# Patient Record
(demographics unavailable — no encounter records)

---

## 2024-10-28 NOTE — PHYSICAL EXAM
[Well Developed] : well developed [Well Nourished] : well nourished [No Acute Distress] : no acute distress [Normal Venous Pressure] : normal venous pressure [No Carotid Bruit] : no carotid bruit [Normal S1, S2] : normal S1, S2 [No Rub] : no rub [Clear Lung Fields] : clear lung fields [Good Air Entry] : good air entry [No Respiratory Distress] : no respiratory distress  [Soft] : abdomen soft [Non Tender] : non-tender [No Masses/organomegaly] : no masses/organomegaly [Normal Bowel Sounds] : normal bowel sounds [Normal Gait] : normal gait [No Edema] : no edema [No Cyanosis] : no cyanosis [No Clubbing] : no clubbing [No Varicosities] : no varicosities [No Rash] : no rash [No Skin Lesions] : no skin lesions [Moves all extremities] : moves all extremities [No Focal Deficits] : no focal deficits [Normal Speech] : normal speech [Alert and Oriented] : alert and oriented [Normal memory] : normal memory [General Appearance - Well Developed] : well developed [Normal Appearance] : normal appearance [Well Groomed] : well groomed [General Appearance - Well Nourished] : well nourished [No Deformities] : no deformities [General Appearance - In No Acute Distress] : no acute distress [Normal Conjunctiva] : the conjunctiva exhibited no abnormalities [Normal Oral Mucosa] : normal oral mucosa [Normal Jugular Venous A Waves Present] : normal jugular venous A waves present [Normal Jugular Venous V Waves Present] : normal jugular venous V waves present [No Jugular Venous Zepeda A Waves] : no jugular venous zepeda A waves [Respiration, Rhythm And Depth] : normal respiratory rhythm and effort [Exaggerated Use Of Accessory Muscles For Inspiration] : no accessory muscle use [Auscultation Breath Sounds / Voice Sounds] : lungs were clear to auscultation bilaterally [Abdomen Soft] : soft [Bowel Sounds] : normal bowel sounds [Nail Clubbing] : no clubbing of the fingernails [Abnormal Walk] : normal gait [Cyanosis, Localized] : no localized cyanosis [Skin Color & Pigmentation] : normal skin color and pigmentation [Skin Turgor] : normal skin turgor [] : no rash [Oriented To Time, Place, And Person] : oriented to person, place, and time [Impaired Insight] : insight and judgment were intact [Affect] : the affect was normal [Mood] : the mood was normal [No Anxiety] : not feeling anxious [5th Left ICS - MCL] : palpated at the 5th LICS in the midclavicular line [Normal] : normal [No Precordial Heave] : no precordial heave was noted [Normal Rate] : normal [Rhythm Regular] : regular [Normal S1] : normal S1 [Normal S2] : normal S2 [No Gallop] : no gallop heard [I] : a grade 1 [2+] : left 2+ [No Abnormalities] : the abdominal aorta was not enlarged and no bruit was heard [No Pitting Edema] : no pitting edema present [Apical Thrill] : no thrill palpable at the apex [S3] : no S3 [S4] : no S4 [Click] : no click [Pericardial Rub] : no pericardial rub [Right Carotid Bruit] : no bruit heard over the right carotid [Left Carotid Bruit] : no bruit heard over the left carotid [Right Femoral Bruit] : no bruit heard over the right femoral artery [Left Femoral Bruit] : no bruit heard over the left femoral artery

## 2024-10-28 NOTE — ASSESSMENT
[FreeTextEntry1] : This is a 56-year-old male with a past medical history significant for past medical history significant for pulmonary emboli, mitral valve regurgitation, non-insulin-dependent diabetes mellitus, s/p inferior wall myocardial infarction, coronary artery disease s/p coronary artery stents (last sent placed September 2024), lupus anticoagulant, s/p [place] filter, dyspepsia, and sleep apnea who comes in for follow-up cardiac evaluation.   Cardiac risk factors include HTN, HLD, and hx of DM (A1C reported to be 5.5%).  HPI: He presents for follow-up s/p cardiac catheterization done 09/26/2024 with successful PCI to LCX.   He is feeling generally well today but reports still experiencing occasional episodes of dyspnea on exertion and chest discomfort at times when going uphill or getting anxious/stressed. He reports that this subsides with rest/relaxation. Denies palpitations, dizziness, syncopal episodes. I've explained that these times may be related to deconditioning or his pulmonary status.   Current Medications: Atorvastatin 40 mg daily, Amlodipine 5 mg daily, Atenolol 100 mg daily, Clopidogrel 75 mg daily, Fish oil 1000 mg daily, Furosemide 20 mg daily, Losartan 100 mg daily, and Warfarin managed by his rheumatologist.  PMH: He states that he is on warfarin as per his rheumatologist, Dr. Alexander for management/history of pulmonary embolism and also states that he has always been on high dose baby aspirin which may have been prescribed by his rheumatologist as well. I explained that from a cardiac standpoint 81 mg of aspirin is sufficient (he has a cardiac stent) however he should verify this with his pulmonologist/rheumatologist.  -Warfarin for management of his history of PE's.   BLOOD PRESSURE: -BP is controlled on today's exam.   BLOOD WORK:  *LDL target goal < 70* -New blood work was done 10/28/2024 to evaluate lipid profile, CBC, BMP, hepatic function, A1C and TSH. -Lipid panel done May 2024 demonstrated triglyceride 155, cholesterol 118, HDL 33, LDL 58, non-HDL 85, LDL direct 60 -Lipid panel done December 2023 demonstrated triglycerides 172, cholesterol 113, HDL 34, LDL 50, Non-HDL 79, LDL direct 63.  -New blood work was done 06/05/2023 demonstrated triglyceride 124, cholesterol 90, HDL 30, LDL 35, non-HDL 59, LDL direct 37.   TESTING/REPORTS: -Cardiac catheterization done 09/26/2024 demonstrated 80% stenosis of proximal circumflex s/p successful PCI with CARLOS, 30% stenosis of mid LAD, 40% stenosis of first diagonal, and 50% stenosis of distal RCA.    -Exercise stress test on September 23, 2024 demonstrated no evidence of myocardial ischemia, but the test was stopped secondary to chest pain/pressure which resolved in the recovery.  -EKG done 05/13/2024 demonstrated sinus bradycardia with nonspecific ST-T wave changes BPM of 59.   -Echocardiogram done December 2023 demonstrated normal left ventricular systolic function EF 64%, minimal mitral regurgitation, mild tricuspid regurgitation, mild pulmonic regurgitation, trace aortic regurgitation.   -EKG done 12/15/2023 which demonstrated sinus bradycardia with nonspecific ST-T wave changes BPM of 54.  -EKG done 06/05/2023 which demonstrated regular sinus rhythm with nonspecific ST-T wave changes BPM of 71.  -EKG done 12/05/2022 which demonstrated regular sinus rhythm with nonspecific ST-T wave changes BPM of 63.   -Echocardiogram done in the office 12/5/2022 which demonstrated borderline concentric LVH, mild thickening of the aortic valve leaflets, mild dilated left atrium, minimal to mild mitral and tricuspid valve regurgitation with trace aortic and pulmonic regurgitation with normal left ventricular systolic function ejection fraction 62%  -EKG done 08/29/2022 which demonstrated regular sinus rhythm with nonspecific ST-T wave changes BPM of 62.  -EKG done Mar 25, 2022 which demonstrated regular sinus rhythm with nonspecific ST-T wave changes, BPM of 58.  -Cardiac cath done May 18, 2021 demonstrated patent RCA and diagonal stents with mild LAD disease. There was 40% stenosis in the mid LAD and discrete 40% stenosis at the site of a prior stent D1. There was a diffuse 30% stenosis in the proximal circumflex. The proximal RCA demonstrated minor luminal irregularities with no flow-limiting lesions. There was a tubular 10% stenosis at the site of a prior stent.  -Echocardiogram done on 6/2/21 demonstrated mild mitral, tricuspid regurgitation, thickened aortic valve with trace aortic regurgitation, mild atrial enlargement with mild concentric LVH with normal left ventricular systolic function. EF of 60%  -Electrocardiogram done 4/14/2021 demonstrated normal sinus rhythm rate 61 beats per minute otherwise remarkable for small Q-wave in lead 2, 3 and aVF.  -Echocardiogram done on October 30 2019 which demonstrated mild mitral and tricuspid regurgitation with normal left ventricular systolic function.  -PMH: The patient had a cardiac catheterization done June 23, 2009 at [facility]. He had a coronary thrombectomy with stent placement to the distal right coronary artery, after balloon angioplasty and thrombectomy. He also had stent placement to the proximal right coronary artery. This patient's LDL cholesterol goal is less than 70 mg/dL.   PLAN: -New blood work was done 10/28/2024 to evaluate lipid profile, CBC, BMP, hepatic function, A1C and TSH. -He will continue with his usual medications and will contact the office if he is having any complaints between now and their next follow up appointment. -He will follow-up with Dr. Alexander and his pulmonologist Dr. Tran routinely.   I have discussed the plan of care with Mr. STEPHENIE TERESA and he will follow up in 1-2 months. He is compliant with all of his medications.  The patient understands that aerobic exercises must be increased to ~40 minutes 4 times/week and a detailed discussion of lifestyle modification was done today.  The patient has a good understanding of the diagnosis, treatment plan and lifestyle modification.  He will contact me at the office for any questions with their care or any changes in their health status.  The plan of care was discussed with the patient with supervision physician Dr. Sonido Lamas and will be carried out as noted above.  SIVAN Perez NP

## 2024-10-28 NOTE — REASON FOR VISIT
[CV Risk Factors and Non-Cardiac Disease] : CV risk factors and non-cardiac disease [Other: ____] : [unfilled] [Follow-Up - Clinic] : a clinic follow-up of [Coronary Artery Disease] : coronary artery disease [Hyperlipidemia] : hyperlipidemia [Hypertension] : hypertension [Mitral Regurgitation] : mitral regurgitation [Peripheral Vascular Disease] : peripheral vascular disease [FreeTextEntry3] : Dr. Cullen  [FreeTextEntry1] : h/o 4 coronary artery stents, h/o krista filter in place, antiphospholipid antibody syndrome, NIDDM, sleep apnea, SLE

## 2024-10-28 NOTE — DISCUSSION/SUMMARY
[FreeTextEntry1] : Dr. Lamas-(PRIOR VISIT and PMH WITH Dr. Lamas): This is a 56-year-old male with past medical history significant for coronary artery disease, pulmonary emboli, mitral valve regurgitation, non-insulin-dependent diabetes mellitus, status post inferior wall myocardial infarction, sleep apnea, coronary artery stents, lupus anticoagulant, status post [place] filter, dyspepsia, who comes in for follow-up cardiac evaluation.   The patient has been complaining of some episodes of dyspnea on exertion.  He denies palpitations, dizziness, or syncope. The patient discontinued his Crestor therapy secondary to "muscle aches".  He did not contact the office.  I have instructed him to rechallenge himself with his Crestor therapy and see if the symptoms come back.  He understands now that in setting of coronary artery disease he must be on lipid-lowering therapy. Exercise stress test on September 23, 2024 demonstrated no evidence of myocardial ischemia, but the test was stopped secondary to chest pain/pressure which resolved in the recovery.. Given this patient's history of myocardial infarction, coronary artery disease, status post coronary stents, and residual 30 to 40% lesions in 2021, I would recommend proceeding with cardiac catheterization to evaluate his dyspnea on exertion as well as his exertional chest pressure/pain. The patient will follow-up with me after cardiac catheterization is completed.  He will hold his Coumadin 48 hours prior to the procedure and continue on his aspirin dosage. The patient understands if he does develop any further chest discomfort or shortness of breath or dyspnea he must go to emergency room immediately. Further recommendation was made after results of his cardiac catheterization available for my review. PMH: The patient had an abnormal nuclear stress test done May 3, 2021.  He complained of chest discomfort with the stress test.  The findings demonstrated severe fixed defect in the basal to mid inferior wall and inferolateral walls consistent with medium sized infarction with no significant carline-infarct ischemia. Given his symptoms, prior clinical history and abnormal nuclear findings the patient underwent a cardiac catheterization on May 18, 2021. Echo Doppler examination done December 15, 2023 demonstrated mild tricuspid valve regurgitation, mild pulmonic valve regurgitation, trace aortic valve regurgitation, minimal mitral valve regurgitation, left atrial enlargement, and ejection fraction of 64%. Cardiac catheterization May 18, 2021 demonstrated 40% stenosis of the mid left anterior descending artery, 40% stenosis in the first diagonal branch at the site of previous stent, 30% stenosis in the proximal circumflex artery, right coronary artery showed no stenosis in the mid section of prior stent and distal right coronary artery area without stenosis in the area of previous stent. Medical therapy was recommended. He will follow-up with his rheumatologist and continue on his anticoagulation. He is followed by his hematologist and rheumatologist. Electrocardiogram done June 2, 2021 demonstrated sinus bradycardia rate of 58 bpm is otherwise remarkable for Q waves in leads II, III and aVF and left atrial abnormality. Blood work done January 15, 2021 demonstrated cholesterol 113, HDL of 33, LDL calculated 56, triglycerides 119, and non-HDL cholesterol of 80 mg/dL.  PMH: The patient had a cardiac catheterization done June 23, 2009 at [facility]. He had a coronary thrombectomy with stent placement to the distal right coronary artery, after balloon angioplasty and thrombectomy. He also had stent placement to the proximal right coronary artery. This patient's LDL cholesterol goal is less than 55 mg/dL.  The patient understands that aerobic exercises must be increased to 40 minutes 4 times per week. A detailed discussion of lifestyle modification was done today. The patient has a good understanding of the diagnosis, and treatment plan. Lifestyle modification was also outlined.

## 2024-11-04 NOTE — REVIEW OF SYSTEMS
[As noted in HPI] : as noted in HPI [As Noted in HPI] : as noted in HPI [Negative] : Heme/Lymph [FreeTextEntry6] : recent URI; received antibiotics

## 2024-11-04 NOTE — DISCUSSION/SUMMARY
[FreeTextEntry1] : 1.  SLE:  generally quiet with no overt activity.  No fever, rash, arthritis, oral ulcers, alopecia.\par  2.  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  Chronic CellCept use:  tolerating without any toxicities such as GI or infectious\par  4.  Antiphospholipid Syndrome:  remains on Coumadin without bleeding or thrombotic episodes\par  5.  Hypertension\par  6.  Hypercholesterolemia\par  7.  Coronary artery disease\par  8.  Diabetes\par  9.  "Flu": in April 2014, now recovered\par  10.  Left knee injury:  unable to go for an MRI given that he has stents in his coronaries.  Currently in a brace. \par  \par  Plan\par  1.  Renew medicines\par  2.  Lab tests\par  3.  Return 1 month\par  4.  PNVX given

## 2024-11-04 NOTE — ASSESSMENT
[FreeTextEntry1] : 1.  SLE:  generally quiet with no overt activity except for polyarthralgias.  No fever, rash, arthritis, oral ulcers, alopecia. 2.  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  Because of a concern for COVID, he reduced the MMF to 500 mg EOD and eventually stopped the medication.  3.  Chronic CellCept use:  tolerating without any toxicities such as GI or infections; however, in 2021 he decided to stop the medication.  4.  Antiphospholipid Syndrome:  remains on Coumadin without thrombotic episodes.  In November 2017 he had some bleeding issues, and he held the warfarin for a brief period until the bleeding resolved.  5.  Hypertension 6.  Hypercholesterolemia: he stopped rosuvastatin because of leg cramps and was subsequently placed on atorvastatin, which caused the same symptoms.  7.  Coronary artery disease: he had EKG changes consistent with another MI in the summer 2015.  He had two more stents placed. He visited Dr. HAZEL Lamas in early November 2017.  Since that visit he has noted intermittent dyspnea on exertion without chest pain, cough.  In 2024 he had a stent placed.  8.  Diabetes 9.  Left knee injury:  unable to go for an MRI given that he has stents in his coronaries.  Currently in a brace.  10.  Varicosities 11.  Pelvic/hip pain:  onset after walking.  The pain worsened particularly on the left side suggesting severe OA or AVN. No paresthesias 12.  Sciatica: onset in early 2022 involving the left side with pain from the buttocks to the left foot.  Although improving as of Mar 2022, the has substantial skin sensitivity, but no rash.   Plan: 1.  Lab tests 2.  Return 1-3 months, depending on findings 3.  Regular INR checks emphasized  4.  Antiphospholipid Syndrome, known as APS, is a chronic autoimmune disease that leads to arterial and/or venous thrombotic events and therefore poses threats to proper organ function and even to life. Therefore, close surveillance of all bodily functions is required, including but not limited to central and peripheral nervous system, ocular and auditory systems, cardiopulmonary function, kidney function, mucocutaneous and musculoskeletal systems. Surveillance consists of history, physical, and laboratory tests. Treatment varies, but those at high risk are generally anticoagulated.  Therefore, patients with APS require close monitoring in the form of blood and urine tests. 5.  Systemic Lupus Erythematosus, known as lupus, is a chronic autoimmune disease that can affect any organ in the body posing threats to proper organ function and even to life. Therefore, close surveillance of all bodily functions is required, including but not limited to central and peripheral nervous system, ocular and auditory systems, cardiopulmonary function, kidney function, mucocutaneous and musculoskeletal systems as well as constitutional manifestations. Surveillance consists of history, physical, and laboratory tests. Treatment varies, but most of the drugs used are high risk and therefore also require close monitoring in the form of blood and urine tests. 6.  High risk medications used in the treatment of rheumatic diseases include steroids, disease modifying agents, immunosuppressive therapies, antimalarials, biologics, and chemotherapy. Regardless of which drug or class of drug, the potential toxicities of these therapies mandate close monitoring in the form of a history, physical, and laboratory tests. 7. Shingrix vaccine recommended 8. F/U cardiology related to statin intolerance

## 2024-11-04 NOTE — HISTORY OF PRESENT ILLNESS
[FreeTextEntry1] : 1.  SLE:  generally quiet with no overt activity except for polyarthralgias.  No fever, rash, arthritis, oral ulcers, alopecia. 2.  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  Because of a concern for COVID, he reduced the MMF to 500 mg EOD and eventually stopped the medication.  3.  Chronic CellCept use:  tolerating without any toxicities such as GI or infections; however, in 2021 he decided to stop the medication.  4.  Antiphospholipid Syndrome:  remains on Coumadin without thrombotic episodes.  In November 2017 he had some bleeding issues, and he held the warfarin for a brief period until the bleeding resolved.  5.  Hypertension 6.  Hypercholesterolemia: he stopped rosuvastatin because of leg cramps and was subsequently placed on atorvastatin, which caused the same symptoms.  7.  Coronary artery disease: he had EKG changes consistent with another MI in the summer 2015.  He had two more stents placed. He visited Dr. HAZEL Lamas in early November 2017.  Since that visit he has noted intermittent dyspnea on exertion without chest pain, cough.  In 2024 he had a stent placed.  8.  Diabetes 9.  Left knee injury:  unable to go for an MRI given that he has stents in his coronaries.  Currently in a brace.  10.  Varicosities 11.  Pelvic/hip pain:  onset after walking.  The pain worsened particularly on the left side suggesting severe OA or AVN. No paresthesias 12.  Sciatica: onset in early 2022 involving the left side with pain from the buttocks to the left foot.  Although improving as of Mar 2022, the has substantial skin sensitivity, but no rash.   Meds hydroxychloroquine 400 mg/d  Coumadin 3 alt 4 mg Atenolol 100 mg/d CellCept 0.5 g 1xd stopped Norvasc 5 mg/d Plavix 75 mg/d atorvastatin 40 mg/d Lasix 20 mg/d Vit D 4000 IU/d Vit C  Vaccines PNVX 10/29/07 PNVX 12/18/2014 ([id number]; exp 5Nov2015) PNVX 23 1/15/2021 Prevnar 13  3/7/16 ([id number]; exp 4/17) Quadrivalent fluzone  11/2/15  ( AB; exp 6/16) Fluvacel 11/20/17  ([id number]; Jun 2018) Flu Quadrivalent 10/19/2020 ([id number]; exp 6/30/2021) Fluarix Quadrivalent 11/29/2021 (; exp 6/30/2022)

## 2024-11-04 NOTE — PHYSICAL EXAM
[General Appearance - Alert] : alert [General Appearance - In No Acute Distress] : in no acute distress [General Appearance - Well Nourished] : well nourished [General Appearance - Well Developed] : well developed [Sclera] : the sclera and conjunctiva were normal [Extraocular Movements] : extraocular movements were intact [Strabismus] : no strabismus was seen [Outer Ear] : the ears and nose were normal in appearance [Examination Of The Oral Cavity] : the lips and gums were normal [Nasal Cavity] : the nasal mucosa and septum were normal [Oropharynx] : the oropharynx was normal [Neck Appearance] : the appearance of the neck was normal [Neck Cervical Mass (___cm)] : no neck mass was observed [Jugular Venous Distention Increased] : there was no jugular-venous distention [Thyroid Diffuse Enlargement] : the thyroid was not enlarged [Respiration, Rhythm And Depth] : normal respiratory rhythm and effort [Exaggerated Use Of Accessory Muscles For Inspiration] : no accessory muscle use [Auscultation Breath Sounds / Voice Sounds] : lungs were clear to auscultation bilaterally [Heart Rate And Rhythm] : heart rate was normal and rhythm regular [Heart Sounds] : normal S1 and S2 [Heart Sounds Gallop] : no gallops [Murmurs] : no murmurs [Heart Sounds Pericardial Friction Rub] : no pericardial rub [Bowel Sounds] : normal bowel sounds [Abdomen Soft] : soft [Abdomen Tenderness] : non-tender [Abdomen Mass (___ Cm)] : no abdominal mass palpated [Cervical Lymph Nodes Enlarged Posterior Bilaterally] : posterior cervical [Cervical Lymph Nodes Enlarged Anterior Bilaterally] : anterior cervical [Supraclavicular Lymph Nodes Enlarged Bilaterally] : supraclavicular [No CVA Tenderness] : no ~M costovertebral angle tenderness [No Spinal Tenderness] : no spinal tenderness [Skin Color & Pigmentation] : normal skin color and pigmentation [Skin Turgor] : normal skin turgor [] : no rash [Sensation] : the sensory exam was normal to light touch and pinprick [Motor Exam] : the motor exam was normal [No Focal Deficits] : no focal deficits [Oriented To Time, Place, And Person] : oriented to person, place, and time [Impaired Insight] : insight and judgment were intact [Affect] : the affect was normal [Nail Clubbing] : no clubbing  or cyanosis of the fingernails [Involuntary Movements] : no involuntary movements were seen [Musculoskeletal - Swelling] : no joint swelling seen [Motor Tone] : muscle strength and tone were normal [FreeTextEntry1] : reduced left hip ROM

## 2024-12-09 NOTE — DISCUSSION/SUMMARY
[FreeTextEntry1] : This is a 56-year-old male with past medical history significant for coronary artery disease, status post drug-eluting stent to the left circumflex artery September 26, 2024, pulmonary emboli, mitral valve regurgitation, non-insulin-dependent diabetes mellitus, status post inferior wall myocardial infarction, sleep apnea, coronary artery stents, lupus anticoagulant, status post [place] filter, dyspepsia, who comes in for follow-up cardiac evaluation.    He denies palpitations, dizziness, or syncope. Electrocardiogram done December 9, 2024 demonstrated normal sinus rhythm rate 67 bpm is otherwise unremarkable. Cardiac catheterization done September 26, 2024 demonstrated 80% lesion to the proximal left circumflex artery, and patient received a drug-eluting stent, 30% lesion in the mid left anterior descending artery, 40% lesion in the first diagonal artery, 50% lesion in the distal right coronary artery and normal left main artery. This patient's LDL target is less than 70 mg/dL.  The patient has had significant myalgias, bilateral lower extremity cramping on statin therapy including Crestor, and atorvastatin. I would recommend he start on Repatha 140 mg subcutaneously every 2 weeks. The patient discontinued his Crestor therapy secondary to "muscle aches".  He did not contact the office.  I have instructed him to rechallenge himself with his Crestor therapy and see if the symptoms come back.  He understands now that in setting of coronary artery disease he must be on lipid-lowering therapy. Exercise stress test on September 23, 2024 demonstrated no evidence of myocardial ischemia, but the test was stopped secondary to chest pain/pressure which resolved in the recovery.. Given this patient's history of myocardial infarction, coronary artery disease, status post coronary stents, and residual 30 to 40% lesions in 2021, I would recommend proceeding with cardiac catheterization to evaluate his dyspnea on exertion as well as his exertional chest pressure/pain. The patient will follow-up with me after cardiac catheterization is completed.  He will hold his Coumadin 48 hours prior to the procedure and continue on his aspirin dosage. He will remain on Plavix as his antiplatelet agent and remains on warfarin for his antiphospholipid syndrome/lupus. PMH: The patient had an abnormal nuclear stress test done May 3, 2021.  He complained of chest discomfort with the stress test.  The findings demonstrated severe fixed defect in the basal to mid inferior wall and inferolateral walls consistent with medium sized infarction with no significant carline-infarct ischemia. Given his symptoms, prior clinical history and abnormal nuclear findings the patient underwent a cardiac catheterization on May 18, 2021. Echo Doppler examination done December 15, 2023 demonstrated mild tricuspid valve regurgitation, mild pulmonic valve regurgitation, trace aortic valve regurgitation, minimal mitral valve regurgitation, left atrial enlargement, and ejection fraction of 64%. Cardiac catheterization May 18, 2021 demonstrated 40% stenosis of the mid left anterior descending artery, 40% stenosis in the first diagonal branch at the site of previous stent, 30% stenosis in the proximal circumflex artery, right coronary artery showed no stenosis in the mid section of prior stent and distal right coronary artery area without stenosis in the area of previous stent. Medical therapy was recommended. He will follow-up with his rheumatologist and continue on his anticoagulation. He is followed by his hematologist and rheumatologist. Electrocardiogram done June 2, 2021 demonstrated sinus bradycardia rate of 58 bpm is otherwise remarkable for Q waves in leads II, III and aVF and left atrial abnormality. Blood work done January 15, 2021 demonstrated cholesterol 113, HDL of 33, LDL calculated 56, triglycerides 119, and non-HDL cholesterol of 80 mg/dL.  PMH: The patient had a cardiac catheterization done June 23, 2009 at [facility]. He had a coronary thrombectomy with stent placement to the distal right coronary artery, after balloon angioplasty and thrombectomy. He also had stent placement to the proximal right coronary artery. This patient's LDL cholesterol goal is less than 55 mg/dL.  The patient understands that aerobic exercises must be increased to 40 minutes 4 times per week. A detailed discussion of lifestyle modification was done today. The patient has a good understanding of the diagnosis, and treatment plan. Lifestyle modification was also outlined.

## 2024-12-09 NOTE — PHYSICAL EXAM
[Well Developed] : well developed [Well Nourished] : well nourished [No Acute Distress] : no acute distress [Normal Venous Pressure] : normal venous pressure [No Carotid Bruit] : no carotid bruit [Normal S1, S2] : normal S1, S2 [No Rub] : no rub [Clear Lung Fields] : clear lung fields [Good Air Entry] : good air entry [No Respiratory Distress] : no respiratory distress  [Soft] : abdomen soft [Non Tender] : non-tender [No Masses/organomegaly] : no masses/organomegaly [Normal Bowel Sounds] : normal bowel sounds [Normal Gait] : normal gait [No Edema] : no edema [No Cyanosis] : no cyanosis [No Clubbing] : no clubbing [No Varicosities] : no varicosities [No Rash] : no rash [No Skin Lesions] : no skin lesions [Moves all extremities] : moves all extremities [No Focal Deficits] : no focal deficits [Normal Speech] : normal speech [Alert and Oriented] : alert and oriented [Normal memory] : normal memory [General Appearance - Well Developed] : well developed [Normal Appearance] : normal appearance [Well Groomed] : well groomed [General Appearance - Well Nourished] : well nourished [No Deformities] : no deformities [General Appearance - In No Acute Distress] : no acute distress [Normal Conjunctiva] : the conjunctiva exhibited no abnormalities [Normal Oral Mucosa] : normal oral mucosa [Normal Jugular Venous A Waves Present] : normal jugular venous A waves present [Normal Jugular Venous V Waves Present] : normal jugular venous V waves present [No Jugular Venous Zepeda A Waves] : no jugular venous zepeda A waves [Respiration, Rhythm And Depth] : normal respiratory rhythm and effort [Exaggerated Use Of Accessory Muscles For Inspiration] : no accessory muscle use [Auscultation Breath Sounds / Voice Sounds] : lungs were clear to auscultation bilaterally [Bowel Sounds] : normal bowel sounds [Abdomen Soft] : soft [Abnormal Walk] : normal gait [Nail Clubbing] : no clubbing of the fingernails [Cyanosis, Localized] : no localized cyanosis [Skin Color & Pigmentation] : normal skin color and pigmentation [Skin Turgor] : normal skin turgor [] : no rash [Oriented To Time, Place, And Person] : oriented to person, place, and time [Impaired Insight] : insight and judgment were intact [Affect] : the affect was normal [Mood] : the mood was normal [No Anxiety] : not feeling anxious [5th Left ICS - MCL] : palpated at the 5th LICS in the midclavicular line [Normal] : normal [No Precordial Heave] : no precordial heave was noted [Normal Rate] : normal [Rhythm Regular] : regular [Normal S1] : normal S1 [Normal S2] : normal S2 [No Gallop] : no gallop heard [I] : a grade 1 [2+] : left 2+ [No Abnormalities] : the abdominal aorta was not enlarged and no bruit was heard [No Pitting Edema] : no pitting edema present [Apical Thrill] : no thrill palpable at the apex [S3] : no S3 [S4] : no S4 [Click] : no click [Pericardial Rub] : no pericardial rub [Right Carotid Bruit] : no bruit heard over the right carotid [Left Carotid Bruit] : no bruit heard over the left carotid [Right Femoral Bruit] : no bruit heard over the right femoral artery [Left Femoral Bruit] : no bruit heard over the left femoral artery

## 2024-12-09 NOTE — REASON FOR VISIT
Neurosurgery at bedside. Pt A&Ox4, respirations even and unlabored, skin color WDL warm and dry, C-collar on, pt laying flat on bed. Pending Neuro surgery consult. PT A&Ox4, respirations even and unlabored, skin color WDL warm and dry, pt is ambulatory with a steady gait. C-collar on. [CV Risk Factors and Non-Cardiac Disease] : CV risk factors and non-cardiac disease [Other: ____] : [unfilled] [Follow-Up - Clinic] : a clinic follow-up of [Coronary Artery Disease] : coronary artery disease [Hyperlipidemia] : hyperlipidemia [Hypertension] : hypertension [Mitral Regurgitation] : mitral regurgitation [Peripheral Vascular Disease] : peripheral vascular disease [FreeTextEntry3] : Dr. Cullen  [FreeTextEntry1] : h/o 4 coronary artery stents, h/o krista filter in place, antiphospholipid antibody syndrome, NIDDM, sleep apnea, SLE

## 2024-12-09 NOTE — ASSESSMENT
[FreeTextEntry1] : This is a 56-year-old male with a past medical history significant for past medical history significant for pulmonary emboli, mitral valve regurgitation, non-insulin-dependent diabetes mellitus, s/p inferior wall myocardial infarction, coronary artery disease s/p coronary artery stents (last sent placed September 2024), lupus anticoagulant, s/p [place] filter, dyspepsia, and sleep apnea who comes in for follow-up cardiac evaluation.   Cardiac risk factors include HTN, HLD, and hx of DM (A1C reported to be 5.5%).  HPI: He presents for follow-up s/p cardiac catheterization done 09/26/2024 with successful PCI to LCX.   He is feeling generally well today but reports still experiencing occasional episodes of dyspnea on exertion and chest discomfort at times when going uphill or getting anxious/stressed. He reports that this subsides with rest/relaxation. Denies palpitations, dizziness, syncopal episodes. I've explained that these times may be related to deconditioning or his pulmonary status.   Current Medications: Atorvastatin 40 mg daily, Amlodipine 5 mg daily, Atenolol 100 mg daily, Clopidogrel 75 mg daily, Fish oil 1000 mg daily, Furosemide 20 mg daily, Losartan 100 mg daily, and Warfarin managed by his rheumatologist.  PMH: He states that he is on warfarin as per his rheumatologist, Dr. Alexander for management/history of pulmonary embolism and also states that he has always been on high dose baby aspirin which may have been prescribed by his rheumatologist as well. I explained that from a cardiac standpoint 81 mg of aspirin is sufficient (he has a cardiac stent) however he should verify this with his pulmonologist/rheumatologist.  -Warfarin for management of his history of PE's.   BLOOD PRESSURE: -BP is controlled on today's exam.   BLOOD WORK:  *LDL target goal < 70* -New blood work was done 10/28/2024 to evaluate lipid profile, CBC, BMP, hepatic function, A1C and TSH. -Lipid panel done May 2024 demonstrated triglyceride 155, cholesterol 118, HDL 33, LDL 58, non-HDL 85, LDL direct 60 -Lipid panel done December 2023 demonstrated triglycerides 172, cholesterol 113, HDL 34, LDL 50, Non-HDL 79, LDL direct 63.  -New blood work was done 06/05/2023 demonstrated triglyceride 124, cholesterol 90, HDL 30, LDL 35, non-HDL 59, LDL direct 37.   TESTING/REPORTS: -Cardiac catheterization done 09/26/2024 demonstrated 80% stenosis of proximal circumflex s/p successful PCI with CARLOS, 30% stenosis of mid LAD, 40% stenosis of first diagonal, and 50% stenosis of distal RCA.    -Exercise stress test on September 23, 2024 demonstrated no evidence of myocardial ischemia, but the test was stopped secondary to chest pain/pressure which resolved in the recovery.  -EKG done 05/13/2024 demonstrated sinus bradycardia with nonspecific ST-T wave changes BPM of 59.   -Echocardiogram done December 2023 demonstrated normal left ventricular systolic function EF 64%, minimal mitral regurgitation, mild tricuspid regurgitation, mild pulmonic regurgitation, trace aortic regurgitation.   -EKG done 12/15/2023 which demonstrated sinus bradycardia with nonspecific ST-T wave changes BPM of 54.  -EKG done 06/05/2023 which demonstrated regular sinus rhythm with nonspecific ST-T wave changes BPM of 71.  -EKG done 12/05/2022 which demonstrated regular sinus rhythm with nonspecific ST-T wave changes BPM of 63.   -Echocardiogram done in the office 12/5/2022 which demonstrated borderline concentric LVH, mild thickening of the aortic valve leaflets, mild dilated left atrium, minimal to mild mitral and tricuspid valve regurgitation with trace aortic and pulmonic regurgitation with normal left ventricular systolic function ejection fraction 62%  -EKG done 08/29/2022 which demonstrated regular sinus rhythm with nonspecific ST-T wave changes BPM of 62.  -EKG done Mar 25, 2022 which demonstrated regular sinus rhythm with nonspecific ST-T wave changes, BPM of 58.  -Cardiac cath done May 18, 2021 demonstrated patent RCA and diagonal stents with mild LAD disease. There was 40% stenosis in the mid LAD and discrete 40% stenosis at the site of a prior stent D1. There was a diffuse 30% stenosis in the proximal circumflex. The proximal RCA demonstrated minor luminal irregularities with no flow-limiting lesions. There was a tubular 10% stenosis at the site of a prior stent.  -Echocardiogram done on 6/2/21 demonstrated mild mitral, tricuspid regurgitation, thickened aortic valve with trace aortic regurgitation, mild atrial enlargement with mild concentric LVH with normal left ventricular systolic function. EF of 60%  -Electrocardiogram done 4/14/2021 demonstrated normal sinus rhythm rate 61 beats per minute otherwise remarkable for small Q-wave in lead 2, 3 and aVF.  -Echocardiogram done on October 30 2019 which demonstrated mild mitral and tricuspid regurgitation with normal left ventricular systolic function.  -PMH: The patient had a cardiac catheterization done June 23, 2009 at [facility]. He had a coronary thrombectomy with stent placement to the distal right coronary artery, after balloon angioplasty and thrombectomy. He also had stent placement to the proximal right coronary artery. This patient's LDL cholesterol goal is less than 70 mg/dL.   PLAN: -New blood work was done 10/28/2024 to evaluate lipid profile, CBC, BMP, hepatic function, A1C and TSH. -He will continue with his usual medications and will contact the office if he is having any complaints between now and their next follow up appointment. -He will follow-up with Dr. Alexander and his pulmonologist Dr. Tran routinely.   I have discussed the plan of care with Mr. STEPHENIE TERESA and he will follow up in 1-2 months. He is compliant with all of his medications.  The patient understands that aerobic exercises must be increased to ~40 minutes 4 times/week and a detailed discussion of lifestyle modification was done today.  The patient has a good understanding of the diagnosis, treatment plan and lifestyle modification.  He will contact me at the office for any questions with their care or any changes in their health status.  The plan of care was discussed with the patient with supervision physician Dr. Sonido Lamas and will be carried out as noted above.  SIVAN Perez NP

## 2025-04-22 NOTE — ASSESSMENT
[FreeTextEntry1] : Prior note nurse practitioner Ana October 28, 2024::  This is a 56-year-old male with a past medical history significant for past medical history significant for pulmonary emboli, mitral valve regurgitation, non-insulin-dependent diabetes mellitus, s/p inferior wall myocardial infarction, coronary artery disease s/p coronary artery stents (last sent placed September 2024), lupus anticoagulant, s/p [place] filter, dyspepsia, and sleep apnea who comes in for follow-up cardiac evaluation.   Cardiac risk factors include HTN, HLD, and hx of DM (A1C reported to be 5.5%).  HPI: He presents for follow-up s/p cardiac catheterization done 09/26/2024 with successful PCI to LCX.   He is feeling generally well today but reports still experiencing occasional episodes of dyspnea on exertion and chest discomfort at times when going uphill or getting anxious/stressed. He reports that this subsides with rest/relaxation. Denies palpitations, dizziness, syncopal episodes. I've explained that these times may be related to deconditioning or his pulmonary status.   Current Medications: Atorvastatin 40 mg daily, Amlodipine 5 mg daily, Atenolol 100 mg daily, Clopidogrel 75 mg daily, Fish oil 1000 mg daily, Furosemide 20 mg daily, Losartan 100 mg daily, and Warfarin managed by his rheumatologist.  PMH: He states that he is on warfarin as per his rheumatologist, Dr. Alexander for management/history of pulmonary embolism and also states that he has always been on high dose baby aspirin which may have been prescribed by his rheumatologist as well. I explained that from a cardiac standpoint 81 mg of aspirin is sufficient (he has a cardiac stent) however he should verify this with his pulmonologist/rheumatologist.  -Warfarin for management of his history of PE's.   BLOOD PRESSURE: -BP is controlled on today's exam.   BLOOD WORK:  *LDL target goal < 70* -New blood work was done 10/28/2024 to evaluate lipid profile, CBC, BMP, hepatic function, A1C and TSH. -Lipid panel done May 2024 demonstrated triglyceride 155, cholesterol 118, HDL 33, LDL 58, non-HDL 85, LDL direct 60 -Lipid panel done December 2023 demonstrated triglycerides 172, cholesterol 113, HDL 34, LDL 50, Non-HDL 79, LDL direct 63.  -New blood work was done 06/05/2023 demonstrated triglyceride 124, cholesterol 90, HDL 30, LDL 35, non-HDL 59, LDL direct 37.   TESTING/REPORTS: -Cardiac catheterization done 09/26/2024 demonstrated 80% stenosis of proximal circumflex s/p successful PCI with CARLOS, 30% stenosis of mid LAD, 40% stenosis of first diagonal, and 50% stenosis of distal RCA.    -Exercise stress test on September 23, 2024 demonstrated no evidence of myocardial ischemia, but the test was stopped secondary to chest pain/pressure which resolved in the recovery.  -EKG done 05/13/2024 demonstrated sinus bradycardia with nonspecific ST-T wave changes BPM of 59.   -Echocardiogram done December 2023 demonstrated normal left ventricular systolic function EF 64%, minimal mitral regurgitation, mild tricuspid regurgitation, mild pulmonic regurgitation, trace aortic regurgitation.   -EKG done 12/15/2023 which demonstrated sinus bradycardia with nonspecific ST-T wave changes BPM of 54.  -EKG done 06/05/2023 which demonstrated regular sinus rhythm with nonspecific ST-T wave changes BPM of 71.  -EKG done 12/05/2022 which demonstrated regular sinus rhythm with nonspecific ST-T wave changes BPM of 63.   -Echocardiogram done in the office 12/5/2022 which demonstrated borderline concentric LVH, mild thickening of the aortic valve leaflets, mild dilated left atrium, minimal to mild mitral and tricuspid valve regurgitation with trace aortic and pulmonic regurgitation with normal left ventricular systolic function ejection fraction 62%  -EKG done 08/29/2022 which demonstrated regular sinus rhythm with nonspecific ST-T wave changes BPM of 62.  -EKG done Mar 25, 2022 which demonstrated regular sinus rhythm with nonspecific ST-T wave changes, BPM of 58.  -Cardiac cath done May 18, 2021 demonstrated patent RCA and diagonal stents with mild LAD disease. There was 40% stenosis in the mid LAD and discrete 40% stenosis at the site of a prior stent D1. There was a diffuse 30% stenosis in the proximal circumflex. The proximal RCA demonstrated minor luminal irregularities with no flow-limiting lesions. There was a tubular 10% stenosis at the site of a prior stent.  -Echocardiogram done on 6/2/21 demonstrated mild mitral, tricuspid regurgitation, thickened aortic valve with trace aortic regurgitation, mild atrial enlargement with mild concentric LVH with normal left ventricular systolic function. EF of 60%  -Electrocardiogram done 4/14/2021 demonstrated normal sinus rhythm rate 61 beats per minute otherwise remarkable for small Q-wave in lead 2, 3 and aVF.  -Echocardiogram done on October 30 2019 which demonstrated mild mitral and tricuspid regurgitation with normal left ventricular systolic function.  -PMH: The patient had a cardiac catheterization done June 23, 2009 at [facility]. He had a coronary thrombectomy with stent placement to the distal right coronary artery, after balloon angioplasty and thrombectomy. He also had stent placement to the proximal right coronary artery. This patient's LDL cholesterol goal is less than 70 mg/dL.   PLAN: -New blood work was done 10/28/2024 to evaluate lipid profile, CBC, BMP, hepatic function, A1C and TSH. -He will continue with his usual medications and will contact the office if he is having any complaints between now and their next follow up appointment. -He will follow-up with Dr. Alexander and his pulmonologist Dr. Tran routinely.   I have discussed the plan of care with Mr. STEPHENIE TERESA and he will follow up in 1-2 months. He is compliant with all of his medications.  The patient understands that aerobic exercises must be increased to ~40 minutes 4 times/week and a detailed discussion of lifestyle modification was done today.  The patient has a good understanding of the diagnosis, treatment plan and lifestyle modification.  He will contact me at the office for any questions with their care or any changes in their health status.  The plan of care was discussed with the patient with supervision physician Dr. Sonido Lamas and will be carried out as noted above.  SIVAN Perez NP

## 2025-04-22 NOTE — DISCUSSION/SUMMARY
[FreeTextEntry1] : This is a 57-year-old male with past medical history significant for coronary artery disease, status post drug-eluting stent to the left circumflex artery September 26, 2024, pulmonary emboli, mitral valve regurgitation, non-insulin-dependent diabetes mellitus, status post inferior wall myocardial infarction, sleep apnea, coronary artery stents, lupus anticoagulant, status post [place] filter, dyspepsia, who comes in for follow-up cardiac evaluation.   He denies palpitations, dizziness, or syncope. The patient is now on Ozempic 0.5 mg once per week and has lost weight. Lipid panel done December 9, 2024 demonstrated an LDL direct of 139 mg/dL, non-HDL cholesterol 156 mg/dL, LDL calculated 128, triglycerides 155, HDL of 42, cholesterol 197 mg/dL, lipoprotein B 113 mg/dL. The patient's LDL target is less than 70 mg/dL.  He continues on his current dose of warfarin for his antiphospholipid antibody syndrome. Lifestyle modification including dietary intervention, and exercise was reinforced. The patient will go for new fasting blood work for lipid panel in the next 4 to 6 weeks. Electrocardiogram done December 9, 2024 demonstrated normal sinus rhythm rate 67 bpm is otherwise unremarkable. Cardiac catheterization done September 26, 2024 demonstrated 80% lesion to the proximal left circumflex artery, and patient received a drug-eluting stent, 30% lesion in the mid left anterior descending artery, 40% lesion in the first diagonal artery, 50% lesion in the distal right coronary artery and normal left main artery. This patient's LDL target is less than 70 mg/dL.  The patient has had significant myalgias, bilateral lower extremity cramping on statin therapy including Crestor, and atorvastatin. I would recommend he start on Repatha 140 mg subcutaneously every 2 weeks. The patient discontinued his Crestor therapy secondary to "muscle aches".  He did not contact the office.  I have instructed him to rechallenge himself with his Crestor therapy and see if the symptoms come back.  He understands now that in setting of coronary artery disease he must be on lipid-lowering therapy. Exercise stress test on September 23, 2024 demonstrated no evidence of myocardial ischemia, but the test was stopped secondary to chest pain/pressure which resolved in the recovery.. Given this patient's history of myocardial infarction, coronary artery disease, status post coronary stents, and residual 30 to 40% lesions in 2021, I would recommend proceeding with cardiac catheterization to evaluate his dyspnea on exertion as well as his exertional chest pressure/pain. The patient will follow-up with me after cardiac catheterization is completed.  He will hold his Coumadin 48 hours prior to the procedure and continue on his aspirin dosage. He will remain on Plavix as his antiplatelet agent and remains on warfarin for his antiphospholipid syndrome/lupus. PMH: The patient had an abnormal nuclear stress test done May 3, 2021.  He complained of chest discomfort with the stress test.  The findings demonstrated severe fixed defect in the basal to mid inferior wall and inferolateral walls consistent with medium sized infarction with no significant carline-infarct ischemia. Given his symptoms, prior clinical history and abnormal nuclear findings the patient underwent a cardiac catheterization on May 18, 2021. Echo Doppler examination done December 15, 2023 demonstrated mild tricuspid valve regurgitation, mild pulmonic valve regurgitation, trace aortic valve regurgitation, minimal mitral valve regurgitation, left atrial enlargement, and ejection fraction of 64%. Cardiac catheterization May 18, 2021 demonstrated 40% stenosis of the mid left anterior descending artery, 40% stenosis in the first diagonal branch at the site of previous stent, 30% stenosis in the proximal circumflex artery, right coronary artery showed no stenosis in the mid section of prior stent and distal right coronary artery area without stenosis in the area of previous stent. Medical therapy was recommended. He will follow-up with his rheumatologist and continue on his anticoagulation. He is followed by his hematologist and rheumatologist. Electrocardiogram done June 2, 2021 demonstrated sinus bradycardia rate of 58 bpm is otherwise remarkable for Q waves in leads II, III and aVF and left atrial abnormality. Blood work done January 15, 2021 demonstrated cholesterol 113, HDL of 33, LDL calculated 56, triglycerides 119, and non-HDL cholesterol of 80 mg/dL.  PMH: The patient had a cardiac catheterization done June 23, 2009 at [facility]. He had a coronary thrombectomy with stent placement to the distal right coronary artery, after balloon angioplasty and thrombectomy. He also had stent placement to the proximal right coronary artery. This patient's LDL cholesterol goal is less than 55 mg/dL.  The patient understands that aerobic exercises must be increased to 40 minutes 4 times per week. A detailed discussion of lifestyle modification was done today. The patient has a good understanding of the diagnosis, and treatment plan. Lifestyle modification was also outlined.